# Patient Record
Sex: FEMALE | Race: WHITE | URBAN - METROPOLITAN AREA
[De-identification: names, ages, dates, MRNs, and addresses within clinical notes are randomized per-mention and may not be internally consistent; named-entity substitution may affect disease eponyms.]

---

## 2022-06-09 ENCOUNTER — APPOINTMENT (RX ONLY)
Dept: URBAN - METROPOLITAN AREA CLINIC 87 | Facility: CLINIC | Age: 57
Setting detail: DERMATOLOGY
End: 2022-06-09

## 2022-06-09 VITALS — HEIGHT: 64 IN | WEIGHT: 150 LBS | TEMPERATURE: 97.8 F

## 2022-06-09 DIAGNOSIS — L81.4 OTHER MELANIN HYPERPIGMENTATION: ICD-10-CM

## 2022-06-09 PROBLEM — D23.5 OTHER BENIGN NEOPLASM OF SKIN OF TRUNK: Status: ACTIVE | Noted: 2022-06-09

## 2022-06-09 PROCEDURE — ? DEFER

## 2022-06-09 PROCEDURE — ? TREATMENT REGIMEN

## 2022-06-09 PROCEDURE — ? COUNSELING

## 2022-06-09 PROCEDURE — 99203 OFFICE O/P NEW LOW 30 MIN: CPT

## 2022-06-09 ASSESSMENT — LOCATION DETAILED DESCRIPTION DERM: LOCATION DETAILED: NASAL TIP

## 2022-06-09 ASSESSMENT — LOCATION SIMPLE DESCRIPTION DERM: LOCATION SIMPLE: NOSE

## 2022-06-09 ASSESSMENT — LOCATION ZONE DERM: LOCATION ZONE: NOSE

## 2022-06-09 NOTE — PROCEDURE: DEFER
Procedure To Be Performed At Next Visit: Excision
Scheduling Instructions (Optional): PT will schedule appointment if spot becomes bothersome.
Introduction Text (Please End With A Colon): The following procedure was deferred:
Detail Level: Zone

## 2022-06-09 NOTE — PROCEDURE: TREATMENT REGIMEN
Samples Given: Arazlo (2)
Initiate Treatment: Arazlo (apply pea size to nose QHS)
Detail Level: Zone